# Patient Record
(demographics unavailable — no encounter records)

---

## 2025-03-05 NOTE — HISTORY OF PRESENT ILLNESS
[de-identified] : Patient presents for LT shoulder pain evaluation. Patient states that about a month ago he was at the gym and he felt a clicking in his shoulder. Patient states that he has had shoulder arthroscopy many years ago due to a sports injury. Patient then saw Dr. 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 He is RHD.

## 2025-03-05 NOTE — DISCUSSION/SUMMARY
[de-identified] : I reviewed patient's radiographs and discussed his condition and treatment options.  Defer further imaging.  Advised starting course of PT.  Follow up as needed.  Patient voiced understanding and agreement with the plan.

## 2025-03-05 NOTE — PHYSICAL EXAM
[] : motor and sensory intact distally [Left] : left shoulder [Glenohumeral arthritis] : Glenohumeral arthritis